# Patient Record
Sex: FEMALE | Race: WHITE | NOT HISPANIC OR LATINO | Employment: FULL TIME | URBAN - METROPOLITAN AREA
[De-identification: names, ages, dates, MRNs, and addresses within clinical notes are randomized per-mention and may not be internally consistent; named-entity substitution may affect disease eponyms.]

---

## 2024-09-30 ENCOUNTER — TRANSCRIBE ORDERS (OUTPATIENT)
Dept: ADMINISTRATIVE | Facility: HOSPITAL | Age: 44
End: 2024-09-30
Payer: COMMERCIAL

## 2024-09-30 DIAGNOSIS — N20.0 CALCULUS OF KIDNEY: Primary | ICD-10-CM

## 2024-10-07 ENCOUNTER — HOSPITAL ENCOUNTER (OUTPATIENT)
Dept: NUCLEAR MEDICINE | Facility: HOSPITAL | Age: 44
Discharge: HOME | End: 2024-10-07
Payer: COMMERCIAL

## 2024-10-07 DIAGNOSIS — N20.0 CALCULUS OF KIDNEY: ICD-10-CM

## 2024-10-07 PROCEDURE — A9562 TC99M MERTIATIDE: HCPCS | Performed by: UROLOGY

## 2024-10-07 PROCEDURE — 0 TECHNETIUM MERTIATIDE: Performed by: UROLOGY

## 2024-10-07 PROCEDURE — 78707 K FLOW/FUNCT IMAGE W/O DRUG: CPT

## 2024-10-07 RX ADMIN — TECHNESCAN TC 99M MERTIATIDE 1 DOSE: 1 INJECTION, POWDER, LYOPHILIZED, FOR SOLUTION INTRAVENOUS at 11:22

## 2024-11-11 ENCOUNTER — HOSPITAL ENCOUNTER (OUTPATIENT)
Dept: CARDIOLOGY | Facility: HOSPITAL | Age: 44
Discharge: HOME | End: 2024-11-11
Payer: COMMERCIAL

## 2024-11-11 ENCOUNTER — LAB (OUTPATIENT)
Dept: LAB | Facility: HOSPITAL | Age: 44
End: 2024-11-11
Payer: COMMERCIAL

## 2024-11-11 ENCOUNTER — TRANSCRIBE ORDERS (OUTPATIENT)
Dept: ADMINISTRATIVE | Facility: HOSPITAL | Age: 44
End: 2024-11-11
Payer: COMMERCIAL

## 2024-11-11 DIAGNOSIS — N20.0 URIC ACID NEPHROLITHIASIS: ICD-10-CM

## 2024-11-11 DIAGNOSIS — N26.1 ATROPHY OF KIDNEY: ICD-10-CM

## 2024-11-11 DIAGNOSIS — N26.1 ATROPHY OF KIDNEY: Primary | ICD-10-CM

## 2024-11-11 LAB
ABO GROUP BLD: NORMAL
ANION GAP SERPL CALCULATED.3IONS-SCNC: 11 MMOL/L (ref 5–15)
BLD GP AB SCN SERPL QL: NEGATIVE
BUN SERPL-MCNC: 7 MG/DL (ref 6–20)
BUN/CREAT SERPL: 7.8 (ref 7–25)
CALCIUM SPEC-SCNC: 8.6 MG/DL (ref 8.6–10.5)
CHLORIDE SERPL-SCNC: 105 MMOL/L (ref 98–107)
CO2 SERPL-SCNC: 21 MMOL/L (ref 22–29)
CREAT SERPL-MCNC: 0.9 MG/DL (ref 0.57–1)
DEPRECATED RDW RBC AUTO: 42.7 FL (ref 37–54)
EGFRCR SERPLBLD CKD-EPI 2021: 81.5 ML/MIN/1.73
EOSINOPHIL # BLD MANUAL: 0.06 10*3/MM3 (ref 0–0.4)
EOSINOPHIL NFR BLD MANUAL: 1 % (ref 0.3–6.2)
ERYTHROCYTE [DISTWIDTH] IN BLOOD BY AUTOMATED COUNT: 16.3 % (ref 12.3–15.4)
GLUCOSE SERPL-MCNC: 95 MG/DL (ref 65–99)
HCT VFR BLD AUTO: 34.6 % (ref 34–46.6)
HGB BLD-MCNC: 10.3 G/DL (ref 12–15.9)
LYMPHOCYTES # BLD MANUAL: 1.66 10*3/MM3 (ref 0.7–3.1)
LYMPHOCYTES NFR BLD MANUAL: 5 % (ref 5–12)
MCH RBC QN AUTO: 21.9 PG (ref 26.6–33)
MCHC RBC AUTO-ENTMCNC: 29.8 G/DL (ref 31.5–35.7)
MCV RBC AUTO: 73.5 FL (ref 79–97)
MONOCYTES # BLD: 0.32 10*3/MM3 (ref 0.1–0.9)
NEUTROPHILS # BLD AUTO: 4.35 10*3/MM3 (ref 1.7–7)
NEUTROPHILS NFR BLD MANUAL: 68 % (ref 42.7–76)
NRBC BLD AUTO-RTO: 0 /100 WBC (ref 0–0.2)
PLAT MORPH BLD: NORMAL
PLATELET # BLD AUTO: 314 10*3/MM3 (ref 140–450)
PMV BLD AUTO: 9.5 FL (ref 6–12)
POTASSIUM SERPL-SCNC: 3.2 MMOL/L (ref 3.5–5.2)
QT INTERVAL: 395 MS
QTC INTERVAL: 458 MS
RBC # BLD AUTO: 4.71 10*6/MM3 (ref 3.77–5.28)
RBC MORPH BLD: NORMAL
RH BLD: POSITIVE
SODIUM SERPL-SCNC: 137 MMOL/L (ref 136–145)
T&S EXPIRATION DATE: NORMAL
VARIANT LYMPHS NFR BLD MANUAL: 26 % (ref 19.6–45.3)
WBC MORPH BLD: NORMAL
WBC NRBC COR # BLD AUTO: 6.39 10*3/MM3 (ref 3.4–10.8)

## 2024-11-11 PROCEDURE — 85025 COMPLETE CBC W/AUTO DIFF WBC: CPT

## 2024-11-11 PROCEDURE — 86901 BLOOD TYPING SEROLOGIC RH(D): CPT

## 2024-11-11 PROCEDURE — 86900 BLOOD TYPING SEROLOGIC ABO: CPT

## 2024-11-11 PROCEDURE — 80048 BASIC METABOLIC PNL TOTAL CA: CPT

## 2024-11-11 PROCEDURE — 36415 COLL VENOUS BLD VENIPUNCTURE: CPT

## 2024-11-11 PROCEDURE — 93005 ELECTROCARDIOGRAM TRACING: CPT | Performed by: UROLOGY

## 2024-11-11 PROCEDURE — 85007 BL SMEAR W/DIFF WBC COUNT: CPT

## 2024-11-11 PROCEDURE — 86850 RBC ANTIBODY SCREEN: CPT

## 2024-11-19 PROCEDURE — 88307 TISSUE EXAM BY PATHOLOGIST: CPT | Performed by: UROLOGY

## 2024-11-21 ENCOUNTER — LAB REQUISITION (OUTPATIENT)
Dept: LAB | Facility: HOSPITAL | Age: 44
End: 2024-11-21
Payer: COMMERCIAL

## 2024-11-21 DIAGNOSIS — N26.1 ATROPHY OF KIDNEY (TERMINAL): ICD-10-CM

## 2024-11-22 LAB
LAB AP CASE REPORT: NORMAL
PATH REPORT.FINAL DX SPEC: NORMAL
PATH REPORT.GROSS SPEC: NORMAL

## 2025-04-23 ENCOUNTER — OFFICE VISIT (OUTPATIENT)
Dept: UROLOGY | Age: 45
End: 2025-04-23
Payer: COMMERCIAL

## 2025-04-23 VITALS — BODY MASS INDEX: 33.34 KG/M2 | HEIGHT: 68 IN | WEIGHT: 220 LBS | RESPIRATION RATE: 12 BRPM

## 2025-04-23 DIAGNOSIS — R10.9 ABDOMINAL PAIN, UNSPECIFIED ABDOMINAL LOCATION: Primary | ICD-10-CM

## 2025-04-23 DIAGNOSIS — N20.0 RENAL STONES: ICD-10-CM

## 2025-04-23 DIAGNOSIS — Z90.5 S/P NEPHRECTOMY: ICD-10-CM

## 2025-04-23 LAB
BILIRUB BLD-MCNC: NEGATIVE MG/DL
CLARITY, POC: CLEAR
COLOR UR: YELLOW
EXPIRATION DATE: ABNORMAL
GLUCOSE UR STRIP-MCNC: NEGATIVE MG/DL
KETONES UR QL: NEGATIVE
LEUKOCYTE EST, POC: ABNORMAL
Lab: ABNORMAL
NITRITE UR-MCNC: NEGATIVE MG/ML
PH UR: 6.5 [PH] (ref 5–8)
PROT UR STRIP-MCNC: NEGATIVE MG/DL
RBC # UR STRIP: NEGATIVE /UL
SP GR UR: 1.01 (ref 1–1.03)
UROBILINOGEN UR QL: ABNORMAL

## 2025-04-23 RX ORDER — PANTOPRAZOLE SODIUM 40 MG/1
TABLET, DELAYED RELEASE ORAL
COMMUNITY
Start: 2024-06-25

## 2025-04-23 NOTE — PROGRESS NOTES
Chief Complaint  UNSPECIFIED ABDOMINAL PAIN (Pt. Had kidney taken been having pain since then which was 11/2024) and NEW PATIENT    Subjective            Jesus Salinas presents to [facility]    History of Present Illness  The patient presents for evaluation of kidney stones.    Approximately 8 months ago, she was hospitalized due to kidney stones, which led to the discovery of a non-functioning kidney. The affected kidney was subsequently removed. The cause of the kidney's dysfunction remains uncertain, but it was suggested that a birth defect may have been the underlying issue. She was reassured that the same problem should not occur in her remaining kidney. However, within 3 months, she developed kidney stones in her other kidney, causing her significant concern. She continues to experience considerable pain and swelling at the surgical site. A renal scan was performed prior to the nephrectomy. A stent was placed in her left UPJ in August. A subsequent scan in February revealed 2 stones. Another scan was conducted last week at [facility] due to the presence of blood in her urine. She has never passed a stone and is concerned about potential blockage of the ureter. She describes a sensation of constant pressure on the inside of her ribs, which she attributes to scar tissue. Her nephrectomy was performed laparoscopically, with a total of 4 incisions made. She underwent lithotripsy approximately 2 years prior.    History  ------------------------------------------  Notes history of procedure of left kidney removal on 11/19/2024 per Dr. Quiroz as left kidney only had approximately 10% function and was continuing to experience recurrent kidney infections and recurrent stones in left kidney. Patient notes was off of work for 7 weeks for recovery. Notes was seen approximately 2 times within that 7 week period for follow up with Dr. Quiroz as notes continued daily pain to left flank area  "and then also to abdomen area along with abdominal swelling above procedure site. Patient notes with follow up appointments was advised per Dr. Quiroz no further treatment plan at those times and wants to have next follow up in August 2025 as last appointment was in February 2025.     Notes pain abdominal to left sided abdominal region is at a constant 5/10 and left flank pain intermittent being at a level 7/10 when that pain occurs. Notes worsening pain with working continued days in a row tending to work 3 days in a row and then takes a day off and returns to work the last day of the week.        Objective   Vital Signs:   Resp 12   Ht 172.7 cm (68\")   Wt 99.8 kg (220 lb)   BMI 33.45 kg/m²       Physical Exam  Vitals and nursing note reviewed.   Constitutional:       Appearance: Normal appearance. She is well-developed.   Pulmonary:      Effort: Pulmonary effort is normal.      Breath sounds: Normal air entry.   Neurological:      Mental Status: She is alert and oriented to person, place, and time.      Motor: Motor function is intact.   Psychiatric:         Mood and Affect: Mood normal.         Behavior: Behavior normal.          Result Review :   The following data was reviewed by: Krystal Santana MD on 04/23/2025:    Results for orders placed or performed in visit on 04/23/25   POC Urinalysis Dipstick, Automated    Collection Time: 04/23/25  2:15 PM    Specimen: Urine   Result Value Ref Range    Color Yellow Yellow, Straw, Dark Yellow, [place]    Clarity, UA Clear Clear    Specific Gravity  1.010 1.005 - 1.030    pH, Urine 6.5 5.0 - 8.0    Leukocytes Trace (A) Negative    Nitrite, UA Negative Negative    Protein, POC Negative Negative mg/dL    Glucose, UA Negative Negative mg/dL    Ketones, UA Negative Negative    Urobilinogen, UA 0.2 E.U./dL Normal, 0.2 E.U./dL    Bilirubin Negative Negative    Blood, UA Negative Negative    Lot Number [id number]     Expiration Date 3,012,026          Results  Imaging  CT " scan from February shows left nephrectomy, normal right kidney, 2 mm right stone, punctate stone in the right lower pole. No swelling. Bladder was normal.              Assessment and Plan    Diagnoses and all orders for this visit:    1. Abdominal pain, unspecified abdominal location (Primary)  -     POC Urinalysis Dipstick, Automated  -     CT Abdomen Pelvis With & Without Contrast; Future  -     XR Abdomen KUB; Future    2. Renal stones    3. S/p nephrectomy      Assessment & Plan    The patient's persistent pain and swelling post-nephrectomy are likely due to internal adhesions or scar tissue formation, which are not visible on CT scans. It is premature to intervene surgically as these symptoms often improve over time. The presence of kidney stones in her right kidney suggests a predisposition to stone formation. There is no evidence of swelling in the right kidney. The pathology report indicates that the left kidney was swollen, but it is unclear whether this was a chronic or congenital issue. The possibility of a UPJ obstruction was discussed, which could have contributed to the formation of more stones on the left side than the right. The report does not mention any inflammation, fluid collections, or hernia, suggesting their absence. The potential causes of scar tissue, including trauma from stone surgery or passing stones, were also discussed. She was advised to seek immediate medical attention if she experiences pain or suspects she is passing a stone. A repeat CT scan will be ordered to provide a comprehensive view of her current condition and to monitor the size of the stones in her right kidney. An x-ray will be scheduled in 6 months to assess the size of the stones in her right kidney.    Follow-up  The patient will follow up in 6 months.    PROCEDURE  The patient underwent a nephrectomy approximately 8 months ago due to a non-functioning kidney. A stent was placed in her left UPJ in August. Lithotripsy  was performed approximately 2 years prior.          Follow Up       Return in about 6 months (around 10/23/2025) for Next scheduled follow up, KUB prior.  Patient was given instructions and counseling regarding her condition or for health maintenance advice. Please see specific information pulled into the AVS if appropriate.     Transcribed from ambient dictation for Krystal Santana MD by Krystal Santana MD.  04/23/25   14:36 EDT    Patient or patient representative verbalized consent for the use of Ambient Listening during the visit with  Krystal Santana MD for chart documentation. 4/24/2025  14:36 EDT

## 2025-04-24 PROBLEM — R10.9 ABDOMINAL PAIN: Status: ACTIVE | Noted: 2025-04-24

## 2025-04-24 PROBLEM — N20.0 RENAL STONES: Status: ACTIVE | Noted: 2025-04-24

## 2025-05-31 ENCOUNTER — HOSPITAL ENCOUNTER (OUTPATIENT)
Dept: CT IMAGING | Facility: HOSPITAL | Age: 45
Discharge: HOME | End: 2025-05-31
Payer: COMMERCIAL

## 2025-05-31 DIAGNOSIS — R10.9 ABDOMINAL PAIN, UNSPECIFIED ABDOMINAL LOCATION: ICD-10-CM

## 2025-05-31 LAB
CREAT BLDA-MCNC: 0.9 MG/DL (ref 0.6–1.3)
EGFRCR SERPLBLD CKD-EPI 2021: 81.5 ML/MIN/1.73

## 2025-05-31 PROCEDURE — 82565 ASSAY OF CREATININE: CPT

## 2025-05-31 PROCEDURE — 25510000001 IOPAMIDOL PER 1 ML: Performed by: UROLOGY

## 2025-05-31 PROCEDURE — 74178 CT ABD&PLV WO CNTR FLWD CNTR: CPT

## 2025-05-31 RX ORDER — IOPAMIDOL 755 MG/ML
100 INJECTION, SOLUTION INTRAVASCULAR
Status: COMPLETED | OUTPATIENT
Start: 2025-05-31 | End: 2025-05-31

## 2025-05-31 RX ADMIN — IOPAMIDOL 100 ML: 755 INJECTION, SOLUTION INTRAVENOUS at 09:00

## 2025-06-05 ENCOUNTER — RESULTS FOLLOW-UP (OUTPATIENT)
Dept: UROLOGY | Age: 45
End: 2025-06-05
Payer: COMMERCIAL

## 2025-06-05 NOTE — PROGRESS NOTES
Patient went over the results of her CT scan.  No findings at the site of her left nephrectomy.  We discussed that she has 2 tiny punctate stones.  She will let me know if her urine output decreases or if she starts having right flank pain.  Otherwise I will see her in October as scheduled.  She voiced understanding.